# Patient Record
Sex: MALE | Race: WHITE | NOT HISPANIC OR LATINO | Employment: OTHER | URBAN - METROPOLITAN AREA
[De-identification: names, ages, dates, MRNs, and addresses within clinical notes are randomized per-mention and may not be internally consistent; named-entity substitution may affect disease eponyms.]

---

## 2022-08-06 ENCOUNTER — APPOINTMENT (OUTPATIENT)
Dept: GENERAL RADIOLOGY | Facility: CLINIC | Age: 37
End: 2022-08-06
Attending: EMERGENCY MEDICINE
Payer: MEDICARE

## 2022-08-06 ENCOUNTER — HOSPITAL ENCOUNTER (EMERGENCY)
Facility: CLINIC | Age: 37
Discharge: HOME | End: 2022-08-06
Attending: EMERGENCY MEDICINE | Admitting: EMERGENCY MEDICINE
Payer: MEDICARE

## 2022-08-06 VITALS
DIASTOLIC BLOOD PRESSURE: 83 MMHG | TEMPERATURE: 98.5 F | SYSTOLIC BLOOD PRESSURE: 128 MMHG | HEART RATE: 78 BPM | OXYGEN SATURATION: 100 % | RESPIRATION RATE: 18 BRPM

## 2022-08-06 DIAGNOSIS — S93.402A SPRAIN OF LEFT ANKLE, UNSPECIFIED LIGAMENT, INITIAL ENCOUNTER: ICD-10-CM

## 2022-08-06 PROCEDURE — 99284 EMERGENCY DEPT VISIT MOD MDM: CPT

## 2022-08-06 PROCEDURE — 73610 X-RAY EXAM OF ANKLE: CPT | Mod: LT

## 2022-08-06 ASSESSMENT — ENCOUNTER SYMPTOMS
ARTHRALGIAS: 1
HEADACHES: 0
BACK PAIN: 0
NECK PAIN: 0

## 2022-08-06 NOTE — Clinical Note
Jonah Dale was seen and treated in our emergency department on 8/6/2022.  He may return to work on 08/10/2022.       If you have any questions or concerns, please don't hesitate to call.      Rafael Russell MD

## 2022-08-06 NOTE — DISCHARGE INSTRUCTIONS
Return to the ER for worsening pain, numbness or discoloration of the toes, or any new concerns.    Please follow-up with your physician in 2 to 3 days for recheck.

## 2022-08-06 NOTE — ED NOTES
Aircast splint applied and crutch training provided. Patient verbalized understanding with demonstration return.

## 2022-08-06 NOTE — ED PROVIDER NOTES
History     Chief Complaint:  Ankle Pain     HPI:  The history is provided by the patient.      Jonah Dale is a 37 year old male with a history of hypertension who presents with lateral left ankle pain after an injury which occurred approximately 2 hours ago around 0830 this morning. He reports that he was walking down a flight of stairs when he missed the last step and twisted his left ankle. He states that he heard a cracking or popping noise and experienced immediate onset of pain, prompting his presentation to the ED this morning. He denies sustaining any other trauma and he is not having any pain to his head, neck, back, knee, foot, or any other site. He is not anticoagulated.    Review of Systems   Musculoskeletal: Positive for arthralgias. Negative for back pain and neck pain.   Neurological: Negative for headaches.   All other systems reviewed and are negative.    Allergies:  The patient has no known allergies.     Medications:  Hydrochlorothiazide  Amlodipine  Nicorette    Past Medical History:     Chronic left shoulder pain  Hypertension   Cryptorchidism  Obesity  Medial epicondylitis  Leukoplakia of oral mucosa    Past Surgical History:    Inguinal hernia repair, bilateral  Knee arthroscopy, bilateral     Family History:    Mother: hypertension, obesity  Father: prostate cancer    Social History:  The patient presents to the ED with his mother.  The patient presents to the ED via car.  The patient is from [place], [place].     Physical Exam     Patient Vitals for the past 24 hrs:   BP Temp Temp src Pulse Resp SpO2   08/06/22 0938 (!) 137/95 98.5  F (36.9  C) Temporal 90 18 95 %     Physical Exam  Constitutional:       Appearance: Normal appearance.   HENT:      Head: Atraumatic.   Pulmonary:      Effort: Pulmonary effort is normal.   Musculoskeletal:         General: Tenderness (over the left lateral malleolus) present.      Comments: No tenderness to the left knee or 5th metatarsal.   Skin:      Capillary Refill: Capillary refill takes less than 2 seconds.   Neurological:      Mental Status: He is alert and oriented to person, place, and time. Mental status is at baseline.   Psychiatric:         Mood and Affect: Mood normal.         Behavior: Behavior normal.       Emergency Department Course     Imaging:    XR Ankle Left G/E 3 Views  The left ankle is negative for fracture or disruption of ankle mortise.  Report per radiology    Emergency Department Course:       Reviewed:  I reviewed nursing notes, vitals, past medical history and Care Everywhere    Assessments:  1030 I obtained history and examined the patient as noted above. I explained imaging findings.    Disposition:  The patient was discharged to home.     Impression & Plan     Medical Decision Making:  Jonah Dale is a 37 year old male who presents to the emergency department for evaluation of left ankle pain.  He has tenderness over the lateral malleolus.  No tenderness of the foot to suggest metatarsal fracture.  X-ray does not demonstrate any fractures or malalignment.  Most likely this is a sprain.  He was given an Aircast and crutches.  He is given a note to be off work.  He is visiting from out of the area and will follow-up with his primary care physician in 2 to 3 days for recheck and referral as needed.    Diagnosis:    ICD-10-CM    1. Sprain of left ankle, unspecified ligament, initial encounter  S93.402A      Scribe Disclosure:  CARI, Kathi Siegel, am serving as a scribe at 10:06 AM on 8/6/2022 to document services personally performed by Rafael Russell MD based on my observations and the provider's statements to me.      Rafael Russell MD  08/06/22 1106

## 2022-08-06 NOTE — ED TRIAGE NOTES
Pt arrives with c/o left ankle pain after missing the bottom step within the last hour. Pt reports he has been unable to walk on it since. Last ibuprofen taken around 0100 for a previous wrist injury.     Triage Assessment     Row Name 08/06/22 0939       Triage Assessment (Adult)    Airway WDL WDL       Respiratory WDL    Respiratory WDL WDL       Skin Circulation/Temperature WDL    Skin Circulation/Temperature WDL WDL       Cardiac WDL    Cardiac WDL WDL       Peripheral/Neurovascular WDL    Peripheral Neurovascular WDL WDL       Cognitive/Neuro/Behavioral WDL    Cognitive/Neuro/Behavioral WDL WDL